# Patient Record
Sex: FEMALE | Race: WHITE | NOT HISPANIC OR LATINO | URBAN - METROPOLITAN AREA
[De-identification: names, ages, dates, MRNs, and addresses within clinical notes are randomized per-mention and may not be internally consistent; named-entity substitution may affect disease eponyms.]

---

## 2022-04-14 ENCOUNTER — MEDICAL CORRESPONDENCE (OUTPATIENT)
Dept: HEALTH INFORMATION MANAGEMENT | Facility: CLINIC | Age: 62
End: 2022-04-14
Payer: COMMERCIAL

## 2022-04-19 ENCOUNTER — TRANSCRIBE ORDERS (OUTPATIENT)
Dept: OTHER | Age: 62
End: 2022-04-19
Payer: COMMERCIAL

## 2022-04-19 DIAGNOSIS — R10.2 PELVIC PAIN: Primary | ICD-10-CM

## 2022-04-19 DIAGNOSIS — M62.89 PELVIC FLOOR TENSION: ICD-10-CM

## 2022-04-21 ENCOUNTER — PRE VISIT (OUTPATIENT)
Dept: UROLOGY | Facility: CLINIC | Age: 62
End: 2022-04-21
Payer: COMMERCIAL

## 2022-04-21 NOTE — TELEPHONE ENCOUNTER
Action 04/21/22 MMT   Action Taken  CSS received incoming referral and records from [facility] to Dr. Newberry for pelvic pain. Documents sent to scanning. Patient is not yet scheduled.

## 2022-04-27 ENCOUNTER — PRE VISIT (OUTPATIENT)
Dept: UROLOGY | Facility: CLINIC | Age: 62
End: 2022-04-27
Payer: COMMERCIAL

## 2022-04-27 NOTE — TELEPHONE ENCOUNTER
Reason for Visit: Consult    Diagnosis: pelvic pain, Possible interstitial cystitis    Orders/Procedures/Records: in system    Contact Patient: n/a    Rooming Requirements: David Francis  04/27/22  3:49 PM

## 2022-04-27 NOTE — TELEPHONE ENCOUNTER
Action 2022 JTV 2:31pm   Action Taken [facility] sent a request to [facility] for images.      Action 2022 JTV 12:09pm   Action Taken [facility] received and resolved images from [facility].          MEDICAL RECORDS REQUEST   [facility]  Urology Clinic  [address] [place] [id number]  PHONE: [phone]  Fax: [phone]        FUTURE VISIT INFORMATION                                                   Susana ATKINSON Akin, : 1960 scheduled for future visit at [facility]    APPOINTMENT INFORMATION:    Date: 2022    Provider:  Marco Antonio Newberry MD    Reason for Visit/Diagnosis: pelvic pain, Possible interstitial cystitis    REFERRAL INFORMATION:    Referring provider:  Kwesi Madrid MD    Referring providers clinic:  [facility]    Clinic contact number:  Ph. [phone], Fax [phone]    RECORDS REQUESTED FOR VISIT                                                     NOTES  STATUS/DETAILS   OFFICE NOTE from referring provider  yes, 2022, 2022, 2022 -- Dr. Kwesi Madrid @ [facility]   OFFICE NOTE from other specialist  yes, 2022 -- Mai Reid APRN, CNP @ [facility]    2022, 03/15/2022 -- Essence Oneal PT @ [facility]    02/15/2022, 12/15/2021 -- Lonnie Gutierrez MD @ [facility]     more in EPIC   DISCHARGE REPORT from the ER  yes, 2022, 2021 -- [facility] EMERGENCY DEPARTMENT   OPERATIVE REPORT  yes, 2022 -- Kwesi Madrid MD  2022 -- Lonnie Gutierrez MD   MEDICATION LIST  yes   LABS     URINALYSIS (UA)  yes, 2022, 2022   IMAGING (IMAGES & REPORT)  yes, 2022 -- IR PELVIC  2022, 2022 -- IR Vascular Embolization  2022, 2022, 2021 -- US Guide Vascular Access  2022, 2021 -- IR RENAL  2021 -- US PELVIS  2021, 2021 -- CT ABD/P     PRE-VISIT  CHECKLIST      Record collection complete YES   Appointment appropriately scheduled           (right time/right provider) Yes   Joint diagnostic appointment coordinated correctly          (ensure right order & amount of time) Yes   MyChart activation If no, please explain pending   Questionnaire complete If no, please explain pending

## 2022-05-04 ENCOUNTER — VIRTUAL VISIT (OUTPATIENT)
Dept: UROLOGY | Facility: CLINIC | Age: 62
End: 2022-05-04
Attending: INTERNAL MEDICINE
Payer: COMMERCIAL

## 2022-05-04 DIAGNOSIS — R39.89 BLADDER PAIN: ICD-10-CM

## 2022-05-04 DIAGNOSIS — R35.0 FREQUENCY OF URINATION: ICD-10-CM

## 2022-05-04 DIAGNOSIS — N95.2 ATROPHIC VAGINITIS: ICD-10-CM

## 2022-05-04 DIAGNOSIS — K59.00 CONSTIPATION, UNSPECIFIED CONSTIPATION TYPE: ICD-10-CM

## 2022-05-04 DIAGNOSIS — R39.15 URINARY URGENCY: Primary | ICD-10-CM

## 2022-05-04 PROCEDURE — 99205 OFFICE O/P NEW HI 60 MIN: CPT | Mod: 95 | Performed by: UROLOGY

## 2022-05-04 NOTE — PROGRESS NOTES
Patient Excluded from Care Coordination? Yes     The patient will be excluded from Care Coordination for the following reason: Patient no longer sees PCP , seeing Dr Rayray Nix  and declined. Susana is a 61 year old who is being evaluated via a billable video visit.      How would you like to obtain your AVS? MyChart  If the video visit is dropped, the invitation should be resent by: Send to e-mail at: [email or web address]  Will anyone else be joining your video visit? No      Video Start Time: 8:32 AM  Video-Visit Details    Type of service:  Video Visit    Video End Time:9:04 AM    Originating Location (pt. Location): Home    Distant Location (provider location):  [facility] UROLOGY CLINIC [place]     Platform used for Video Visit: [employer]

## 2022-05-04 NOTE — PROGRESS NOTES
HPI:  Susana Gerardo is a 61 year old female being seen for pelvic pain.  This started in April of 2021.  Her pain is above the pubic pain and off to the sides.  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 This helped a little with some pain higher up but it did not help with her lower abdominal pain.  She tried Amitriptyline but it made her feel very down.  She has also had some stabbing pain that sent her to the ER at night so she thought it helped a little but she could not tolerate.  She has also been to pelvic floor PT which helped with some constipation which started around the same time but her pain continues.  She was seen by gastroenterology.  She has a solitary left kidney and is s/p right nephrectomy for kidney cancer.    Reviewed previous notes from Dr. Madrid on 4/14/22 and Dr. Aaron from Urology on 2/15/22.    Menopause was when she was 45, no HRT    She is also experiencing urgency and frequency.    Exam:  There were no vitals taken for this visit.  GENERAL: Healthy, alert and no distress  EYES: Eyes grossly normal to inspection.  No discharge or erythema, or obvious scleral/conjunctival abnormalities.  RESP: No audible wheeze, cough, or visible cyanosis.  No visible retractions or increased work of breathing.    SKIN: Visible skin clear. No significant rash, abnormal pigmentation or lesions.  NEURO: Cranial nerves grossly intact.  Mentation and speech appropriate for age.  PSYCH: Mentation appears normal, affect normal/bright, judgement and insight intact, normal speech and appearance well-groomed.    Review of Imaging:  The following imaging exams were independently viewed by me and discussed with patient:  CT abdomen/pelvic 12/4/21  IMPRESSION:   1. Stable reversal of flow in the gonadal veins, left greater than right. This can be associated with pelvic congestion syndrome or may be incidental.   2. No acute findings in the abdomen or pelvis.     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 No venous reflux into the legs. Technically successful transcatheter embolization of the bilateral internal iliac veins to stasis using multiple fibered and non-fibered detachable micro coils via right transfemoral approach. PT tolerated very well.       Review of Labs:  The following labs were reviewed by me and discussed with the patient:  3/14/22  UA showed +1 bacteria  CMP normal with cr of 0.87      Assessment & Plan   60 y/o female with urinary pain and pressure in the suprapubic area.  She is also experiencing urinary urgency and frequency as well as constipation and atrophic vaginitis.  We discussed management of her symptoms however given the duration will proceed with a little f/u w/u.  -DNA sequencing of urine  -New Rx for estrogen cream  -f/u in 1 months for cystoscopy  -if symptoms continue after appropriate treatment then consider medications such as Uribel and/or anticholinergic for symptomatic relief.    Marco Antonio Newberry MD  [facility] UROLOGY CLINIC [place]      ==========================      Additional Coding Information:  Time spent:  60 minutes spent on the date of the encounter doing chart review, history and exam, documentation and further activities per the note

## 2022-05-04 NOTE — LETTER
5/4/2022       RE: Susana Gerardo  [address] [facility] [address]     Dear Colleague,    Thank you for referring your patient, Susana Gerardo, to the [facility] UROLOGY CLINIC [place] [facility]. Please see a copy of my visit note below.    Susana is a 61 year old who is being evaluated via a billable video visit.      How would you like to obtain your AVS? MyChart  If the video visit is dropped, the invitation should be resent by: Send to e-mail at: [email or web address]  Will anyone else be joining your video visit? No      Video Start Time: 8:32 AM  Video-Visit Details    Type of service:  Video Visit    Video End Time:9:04 AM    Originating Location (pt. Location): Home    Distant Location (provider location):  [facility]OLOGY [facility]IC [place]     Platform used for Video Visit: [employer]      HPI:  Susana Gerardo is a 61 year old female being seen for pelvic pain.  This started in April of 2021.  Her pain is above the pubic pain and off to the sides.  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 This helped a little with some pain higher up but it did not help with her lower abdominal pain.  She tried Amitriptyline but it made her feel very down.  She has also had some stabbing pain that sent her to the ER at night so she thought it helped a little but she could not tolerate.  She has also been to pelvic floor PT which helped with some constipation which started around the same time but her pain continues.  She was seen by gastroenterology.  She has a solitary left kidney and is s/p right nephrectomy for kidney cancer.    Reviewed previous notes from Dr. Madrid on 4/14/22 and Dr. Aaron from Urology on 2/15/22.    Menopause was when she was 45, no HRT    She is also experiencing urgency and frequency.    Exam:  There were no vitals taken for this visit.  GENERAL: Healthy, alert and no distress  EYES: Eyes grossly normal to  inspection.  No discharge or erythema, or obvious scleral/conjunctival abnormalities.  RESP: No audible wheeze, cough, or visible cyanosis.  No visible retractions or increased work of breathing.    SKIN: Visible skin clear. No significant rash, abnormal pigmentation or lesions.  NEURO: Cranial nerves grossly intact.  Mentation and speech appropriate for age.  PSYCH: Mentation appears normal, affect normal/bright, judgement and insight intact, normal speech and appearance well-groomed.    Review of Imaging:  The following imaging exams were independently viewed by me and discussed with patient:  CT abdomen/pelvic 12/4/21  IMPRESSION:   1. Stable reversal of flow in the gonadal veins, left greater than right. This can be associated with pelvic congestion syndrome or may be incidental.   2. No acute findings in the abdomen or pelvis.     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 No venous reflux into the legs. Technically successful transcatheter embolization of the bilateral internal iliac veins to stasis using multiple fibered and non-fibered detachable micro coils via right transfemoral approach. PT tolerated very well.       Review of Labs:  The following labs were reviewed by me and discussed with the patient:  3/14/22  UA showed +1 bacteria  CMP normal with cr of 0.87      Assessment & Plan   62 y/o female with urinary pain and pressure in the suprapubic area.  She is also experiencing urinary urgency and frequency as well as constipation and atrophic vaginitis.  We discussed management of her symptoms however given the duration will proceed with a little f/u w/u.  -DNA sequencing of urine  -New Rx for estrogen cream  -f/u in 1 months for cystoscopy  -if symptoms continue after appropriate treatment then consider medications such as Uribel and/or anticholinergic for symptomatic  relief.    Marco Antonio Newberry MD  [facility] UROLOGY [facility] [place]      ==========================      Additional Coding Information:  Time spent:  60 minutes spent on the date of the encounter doing chart review, history and exam, documentation and further activities per the note

## 2022-05-04 NOTE — PATIENT INSTRUCTIONS
-Please schedule a nurse visit for DNA sequencing of urine  -New Rx for estrogen cream  -f/u in 1 months for cystoscopy (6/8/22 @ 8:45)  -if symptoms continue after appropriate treatment then consider medications such as Uribel and/or anticholinergic for symptomatic relief.

## 2022-05-13 ENCOUNTER — TELEPHONE (OUTPATIENT)
Dept: UROLOGY | Facility: CLINIC | Age: 62
End: 2022-05-13
Payer: COMMERCIAL

## 2022-05-13 NOTE — TELEPHONE ENCOUNTER
Call patient to schedule follow up in the Urology Clinic per checkout visit on 5/4/22 with Dr Brian.  Per provider schedule Cysto and set appointment for 6/15 at 8:45 AM. Left message with appointment date and time.

## 2022-05-16 ENCOUNTER — TELEPHONE (OUTPATIENT)
Dept: UROLOGY | Facility: CLINIC | Age: 62
End: 2022-05-16
Payer: COMMERCIAL

## 2022-05-16 NOTE — TELEPHONE ENCOUNTER
[facility] Call Center    Phone Message    May a detailed message be left on voicemail: yes     Reason for Call: Other: Pt called and has some questions regarding the Cysto - she may want to push it back to when she comes back from her vacation but would like clarification first, Please call pt to further discuss, Thanks!     Action Taken: Message routed to:  [facility] Uro    Travel Screening: Not Applicable

## 2022-05-16 NOTE — TELEPHONE ENCOUNTER
Spoke to pt. Pt reports that she will not be able to get to [place] on 6/15/22. She would like Dr. Newberry to make any recommendations of a urologist to see that is more local or if it is necessary to have cystoscopy completed.     Sissy Gordon, RN MSN

## 2022-05-16 NOTE — TELEPHONE ENCOUNTER
Marco Antonio Newberry MD Bratsch, Sissy SALEEM RN  Cc: Nichole Coello RN  Caller: Unspecified (Today,  9:39 AM)  Yes, that is our recommendation.  I don't know who would be closer to her, but she could certainly ask her primary to refer her to someone closer.  They would know better.     Spoke to pt. Assisted pt to reschedule for July.     Sissy Gordon, ISHAN MSN

## 2022-05-29 ENCOUNTER — HEALTH MAINTENANCE LETTER (OUTPATIENT)
Age: 62
End: 2022-05-29

## 2022-07-06 ENCOUNTER — PRE VISIT (OUTPATIENT)
Dept: UROLOGY | Facility: CLINIC | Age: 62
End: 2022-07-06

## 2022-07-06 ENCOUNTER — TELEPHONE (OUTPATIENT)
Dept: UROLOGY | Facility: CLINIC | Age: 62
End: 2022-07-06

## 2022-07-06 NOTE — TELEPHONE ENCOUNTER
Reason for Visit: Cystoscopy    Diagnosis: Urinary urgency, Frequency of urination, Bladder pain, Constipation, Atrophic vaginitis    Orders/Procedures/Records: in system    Contact Patient: n/a    Rooming Requirements: UA dip prior to getting ready for cystoscopy. If positive for Leuks and/or Nitrites, will not do cystoscopy. If positive, send urine for official UA / UC.      Yin Francis  07/06/22  5:49 PM

## 2022-07-08 ENCOUNTER — TELEPHONE (OUTPATIENT)
Dept: UROLOGY | Facility: CLINIC | Age: 62
End: 2022-07-08

## 2022-07-15 NOTE — TELEPHONE ENCOUNTER
[facility] Call Center    Phone Message    May a detailed message be left on voicemail: yes     Reason for Call: Patient has been out of country and would like to speak with Johan.  Has a few questions in regards to DNA sequencing.      Per patient, if she is not reachable, please leave her a message with a direct number to reach Johan.    Action Taken: Message routed to:  [facility] URology    Travel Screening: Not Applicable

## 2022-09-06 ENCOUNTER — PRE VISIT (OUTPATIENT)
Dept: UROLOGY | Facility: CLINIC | Age: 62
End: 2022-09-06

## 2022-09-06 NOTE — TELEPHONE ENCOUNTER
Reason for Visit: Follow-up to discuss her symptoms prior to next cysto being scheduled    Diagnosis: Urinary urgency, Frequency of urination, Bladder pain, Constipation, Atrophic vaginitis    Orders/Procedures/Records: in system    Contact Patient: n/a    Rooming Requirements: David Francis  09/06/22  3:53 PM

## 2022-09-07 ENCOUNTER — VIRTUAL VISIT (OUTPATIENT)
Dept: UROLOGY | Facility: CLINIC | Age: 62
End: 2022-09-07
Payer: COMMERCIAL

## 2022-09-07 DIAGNOSIS — N95.2 ATROPHIC VAGINITIS: Primary | ICD-10-CM

## 2022-09-07 PROCEDURE — 99214 OFFICE O/P EST MOD 30 MIN: CPT | Mod: 95 | Performed by: UROLOGY

## 2022-09-07 RX ORDER — ESTRADIOL 0.1 MG/G
2 CREAM VAGINAL
Qty: 42.5 G | Refills: 11 | Status: SHIPPED | OUTPATIENT
Start: 2022-09-08

## 2022-09-07 NOTE — PATIENT INSTRUCTIONS
-Please schedule a nurse visit for DNA sequencing of urine  -New Rx for estrogen cream  -pt. Needs a cystoscopy at some point but she has issues with transportation, so we discussed finding a local urologist because it is too difficult to manage this kind of problem remotely.  -if symptoms continue after appropriate treatment then consider medications such as Uribel and/or anticholinergic for symptomatic relief.  -f/u with me prn

## 2022-09-07 NOTE — LETTER
9/7/2022       RE: Susana Gerardo  [address] [facility] [address]     Dear Colleague,    Thank you for referring your patient, Susana Gerardo, to the [facility]OLOGY CLINIC [place] [facility]. Please see a copy of my visit note below.    .    Reason for visit:  F/u on symptoms.    Clinical data:  Ms. Gerardo is a 62 year old female with hx of pelvic pain.  This started in April of 2021.  Her pain is above the pubic pain and off to the sides.  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 This helped a little with some pain higher up but it did not help with her lower abdominal pain.  She tried Amitriptyline but it made her feel very down.  She has also had some stabbing pain that sent her to the ER at night so she thought it helped a little but she could not tolerate.  She has also been to pelvic floor PT which helped with some constipation which started around the same time but her pain continues.  She was seen by gastroenterology.  She has a solitary left kidney and is s/p right nephrectomy for kidney cancer.    Menopause was when she was 45, no HRT    She is also experiencing urgency and frequency.    We had planned on sending her urine for DNA sequencing and having her come in for a cystoscopy but she was out of the country and this did not happen.  Also she did not start her estrogen cream.    CT abdomen/pelvic 12/4/21  IMPRESSION:   1. Stable reversal of flow in the gonadal veins, left greater than right. This can be associated with pelvic congestion syndrome or may be incidental.   2. No acute findings in the abdomen or pelvis.     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 No venous reflux into the legs. Technically successful transcatheter embolization of the bilateral internal iliac veins to stasis using  multiple fibered and non-fibered detachable micro coils via right transfemoral approach. PT tolerated very well.       3/14/22  UA showed +1 bacteria  CMP normal with cr of 0.87      Assessment & Plan   61 y/o female with urinary pain and pressure in the suprapubic area.  She is also experiencing urinary urgency and frequency as well as constipation and atrophic vaginitis.  We discussed management of her symptoms however given the duration will proceed with a little f/u w/u.  -DNA sequencing of urine  -New Rx for estrogen cream  -pt. Needs a cystoscopy at some point but she has issues with transportation, so we discussed finding a local urologist because it is too difficult to manage this kind of problem remotely.  Furthermore patient has some memory issues due to a previous concussion which makes it even more difficult.  I recommended some urologist close by.  -if symptoms continue after appropriate treatment then consider medications such as Uribel and/or anticholinergic for symptomatic relief.  -f/u with me rowena Newberry MD  [facility] UROLOGY [facility]      ==========================      Additional Coding Information:  Time spent:  35 minutes spent on the date of the encounter doing chart review, history and exam, documentation and further activities per the note    Susana is a 62 year old who is being evaluated via a billable video visit.      How would you like to obtain your AVS? MyChart  If the video visit is dropped, the invitation should be resent by: Text to cell phone: [phone]  Will anyone else be joining your video visit? No        Video-Visit Details    Video Start Time: 8:23 AM    Type of service:  Video Visit    Video End Time:8:41 AM    Originating Location (pt. Location): Home    Distant Location (provider location):  [facility] UROLOGY [facility]     Platform used for Video Visit: [employer]          Again, thank you for allowing me to participate in the  care of your patient.      Sincerely,    Marco Antonio Newberry MD

## 2022-09-07 NOTE — LETTER
Date:September 7, 2022      Patient was self referred, no letter generated. Do not send.        [facility] Health Information

## 2022-09-07 NOTE — PROGRESS NOTES
Reason for visit:  F/u on symptoms.    Clinical data:  Ms. Gerardo is a 62 year old female with hx of pelvic pain.  This started in April of 2021.  Her pain is above the pubic pain and off to the sides.  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 This helped a little with some pain higher up but it did not help with her lower abdominal pain.  She tried Amitriptyline but it made her feel very down.  She has also had some stabbing pain that sent her to the ER at night so she thought it helped a little but she could not tolerate.  She has also been to pelvic floor PT which helped with some constipation which started around the same time but her pain continues.  She was seen by gastroenterology.  She has a solitary left kidney and is s/p right nephrectomy for kidney cancer.    Menopause was when she was 45, no HRT    She is also experiencing urgency and frequency.    We had planned on sending her urine for DNA sequencing and having her come in for a cystoscopy but she was out of the country and this did not happen.  Also she did not start her estrogen cream.    CT abdomen/pelvic 12/4/21  IMPRESSION:   1. Stable reversal of flow in the gonadal veins, left greater than right. This can be associated with pelvic congestion syndrome or may be incidental.   2. No acute findings in the abdomen or pelvis.     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 No venous reflux into the legs. Technically successful transcatheter embolization of the bilateral internal iliac veins to stasis using multiple fibered and non-fibered detachable micro coils via right transfemoral approach. PT tolerated very well.       3/14/22  UA showed +1 bacteria  CMP normal with cr of 0.87      Assessment & Plan   63 y/o female with urinary pain and pressure in the suprapubic area.  She is also experiencing urinary  urgency and frequency as well as constipation and atrophic vaginitis.  We discussed management of her symptoms however given the duration will proceed with a little f/u w/u.  -DNA sequencing of urine  -New Rx for estrogen cream  -pt. Needs a cystoscopy at some point but she has issues with transportation, so we discussed finding a local urologist because it is too difficult to manage this kind of problem remotely.  Furthermore patient has some memory issues due to a previous concussion which makes it even more difficult.  I recommended some urologist close by.  -if symptoms continue after appropriate treatment then consider medications such as Uribel and/or anticholinergic for symptomatic relief.  -f/u with me rowena Newberry MD  [facility] UROLOGY [facility]      ==========================      Additional Coding Information:  Time spent:  35 minutes spent on the date of the encounter doing chart review, history and exam, documentation and further activities per the note    Susana is a 62 year old who is being evaluated via a billable video visit.      How would you like to obtain your AVS? MyChart  If the video visit is dropped, the invitation should be resent by: Text to cell phone: [phone]  Will anyone else be joining your video visit? No        Video-Visit Details    Video Start Time: 8:23 AM    Type of service:  Video Visit    Video End Time:8:41 AM    Originating Location (pt. Location): Home    Distant Location (provider location):  [facility] UROLOGY [facility]     Platform used for Video Visit: [employer]

## 2022-10-03 ENCOUNTER — HEALTH MAINTENANCE LETTER (OUTPATIENT)
Age: 62
End: 2022-10-03

## 2022-12-22 ENCOUNTER — TELEPHONE (OUTPATIENT)
Dept: UROLOGY | Facility: CLINIC | Age: 62
End: 2022-12-22

## 2022-12-22 DIAGNOSIS — R39.89 BLADDER PAIN: ICD-10-CM

## 2022-12-22 DIAGNOSIS — R39.15 URGENCY OF URINATION: Primary | ICD-10-CM

## 2022-12-22 DIAGNOSIS — R35.0 URINARY FREQUENCY: ICD-10-CM

## 2022-12-22 NOTE — TELEPHONE ENCOUNTER
Spoke to pt. Pt has been seeing urologist up in [place] with normal follow up. Then started seeing a gynocologist and no concerns were noted. So she believes that she does have interstitial cystitis.     Pain has gotten worse in the last week. Sharp pains on either side of hips and right above pubic bone. Lasts up to 3 hours. Rated 9/10. She states that it is intolerable and has to go home when it recurs while at work. Pt is able to urinate well and empty bladder ok. Increased urgency and frequency recently. No fever. No hematuria. Urine is yellow and clear. No odor noted. Pt also feels bloated. Advised to increase water intake and take tylenol, ibuprofen for relief or use a heating pad. Pt will get UA/UC done locally tomorrow. Will have orders faxed to local clinic at [facility] in [place], [place] at [phone]. Also assisted pt to schedule follow up visit with provider.     Sissy Gordon MSN RN    Orders Placed This Encounter   Procedures     Routine UA with microscopic - No culture     Standing Status:   Future     Standing Expiration Date:   12/22/2023     Urine Culture Aerobic Bacterial     Standing Status:   Future     Standing Expiration Date:   12/22/2023

## 2022-12-22 NOTE — TELEPHONE ENCOUNTER
[facility] Call Center    Phone Message    May a detailed message be left on voicemail: yes     Reason for Call: Patient called stating that she needs to speak to Dr. Newberry regarding her continuous symptoms. States they are starting to get really painful and the pain level is really high. Stated that she went to the doctor's office yesterday and she needs to talk about what they were talking about. Patient didn't want to go into detail. Writer sending TE but not as symptoms message since patient has been being seen for these already just currently having pain. Will [place] high priority. Please contact this patient in regards to this message. Thank you     Action Taken: Message routed to:  [facility] ([facility] Urology    Travel Screening: Not Applicable

## 2022-12-22 NOTE — TELEPHONE ENCOUNTER
Requested orders faxed to [facility] in [place], [place] at [phone].    Cherrie Leahy CMA  12/22/22  2:49 PM

## 2022-12-28 ENCOUNTER — PRE VISIT (OUTPATIENT)
Dept: UROLOGY | Facility: CLINIC | Age: 62
End: 2022-12-28

## 2022-12-28 NOTE — TELEPHONE ENCOUNTER
Reason for Visit: Follow-up to discuss Bladder pain    Diagnosis: urinary pain, pressure in the suprapubic area, urinary urgency/frequency, atrophic vaginitis    Rooming Requirements: David Francis  12/28/22  3:25 PM

## 2023-01-04 ENCOUNTER — VIRTUAL VISIT (OUTPATIENT)
Dept: UROLOGY | Facility: CLINIC | Age: 63
End: 2023-01-04
Payer: COMMERCIAL

## 2023-01-04 DIAGNOSIS — N95.2 ATROPHIC VAGINITIS: ICD-10-CM

## 2023-01-04 DIAGNOSIS — K59.04 CHRONIC IDIOPATHIC CONSTIPATION: Primary | ICD-10-CM

## 2023-01-04 PROCEDURE — 99215 OFFICE O/P EST HI 40 MIN: CPT | Mod: 95 | Performed by: UROLOGY

## 2023-01-04 NOTE — PROGRESS NOTES
Video-Visit Details    Type of service:  Video Visit    Video Start Time (time video started): 8:42 AM      Video End Time (time video stopped): 9:00 AM      Originating Location (pt. Location): Home        Distant Location (provider location):  Off-site    Mode of Communication:  Video Conference via Kim Gallardo

## 2023-01-04 NOTE — PROGRESS NOTES
Reason for visit:  F/u on symptoms.    Clinical data:  Ms. Gerardo is a 62 year old female with hx of pelvic pain.  This started in April of 2021.  Her pain is above the pubic pain and off to the sides.  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 This helped a little with some pain higher up but it did not help with her lower abdominal pain.  She tried Amitriptyline but it made her feel very down.  She has also had some stabbing pain that sent her to the ER at night so she thought it helped a little but she could not tolerate.  She has also been to pelvic floor PT which helped with some constipation which started around the same time but her pain continues.  She was seen by gastroenterology.  She has a solitary left kidney and is s/p right nephrectomy for kidney cancer.    Menopause was when she was 45, no HRT    She is also experiencing urgency and frequency.    We had planned on sending her urine for DNA sequencing and having her come in for a cystoscopy [place] and [place] happen.  She did start her estrogen cream and felt that it helps a little.   She did get a cystoscopy eventually by Dr. Johnson at the end of 2022.  Per her report it was normal.  She also had gynecological w/u and it was negative.    CT abdomen/pelvic 12/4/21  IMPRESSION:   1. Stable reversal of flow in the gonadal veins, left greater than right. This can be associated with pelvic congestion syndrome or may be incidental.   2. No acute findings in the abdomen or pelvis.     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 No venous reflux into the legs. Technically successful transcatheter embolization of the bilateral internal iliac veins to stasis using multiple fibered and non-fibered detachable micro coils via right transfemoral approach. PT tolerated very well.       3/14/22  UA  showed +1 bacteria  CMP normal with cr of 0.87      Assessment & Plan   61 y/o female with  pressure in the suprapubic area and hip area.  She is also experiencing abdominal bloating and cramping.  This does not seem to be related to her urinary tract.  Her cystoscopy by Dr. Johnson was normal.  This sounds like a GI problem and I recommended she revisit with her GI physician.  I am happy to discuss this further with her physician.  She does not have any urgency or frequency.  PFPT for her bowels helps a little but unless she follows a strict diet her symptoms of constipation return  -continue estrogen cream for atrophic vaginitis  -f/u with with her GI doctor  -f/u with me rowena Newberry MD  [facility] [place]      ==========================      Additional Coding Information:  Time spent:  42 minutes spent on the date of the encounter doing chart review, history and exam, documentation and further activities per the note

## 2023-01-04 NOTE — LETTER
1/4/2023       RE: Susana Gerardo  [address] [facility] [address]     Dear Colleague,    Thank you for referring your patient, Susana Gerardo, to the [facility] [place] at [facility]. Please see a copy of my visit note below.    Video-Visit Details    Type of service:  Video Visit    Video Start Time (time video started): 8:42 AM      Video End Time (time video stopped): 9:00 AM      Originating Location (pt. Location): Home        Distant Location (provider location):  Off-site    Mode of Communication:  Video Conference via [employer]    Fadumo Gallardo      Reason for visit:  F/u on symptoms.    Clinical data:  Ms. Gerardo is a 62 year old female with hx of pelvic pain.  This started in April of 2021.  Her pain is above the pubic pain and off to the sides.  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 This helped a little with some pain higher up but it did not help with her lower abdominal pain.  She tried Amitriptyline but it made her feel very down.  She has also had some stabbing pain that sent her to the ER at night so she thought it helped a little but she could not tolerate.  She has also been to pelvic floor PT which helped with some constipation which started around the same time but her pain continues.  She was seen by gastroenterology.  She has a solitary left kidney and is s/p right nephrectomy for kidney cancer.    Menopause was when she was 45, no HRT    She is also experiencing urgency and frequency.    We had planned on sending her urine for DNA sequencing and having her come in for a cystoscopy but [place] was [place] and this did not happen.  She did start her estrogen cream and felt that it helps a little.   She did get a cystoscopy eventually by Dr. Johnson at the end of 2022.  Per her report it was normal.  She also had gynecological w/u and it was negative.    CT abdomen/pelvic  12/4/21  IMPRESSION:   1. Stable reversal of flow in the gonadal veins, left greater than right. This can be associated with pelvic congestion syndrome or may be incidental.   2. No acute findings in the abdomen or pelvis.     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 No venous reflux into the legs. Technically successful transcatheter embolization of the bilateral internal iliac veins to stasis using multiple fibered and non-fibered detachable micro coils via right transfemoral approach. PT tolerated very well.       3/14/22  UA showed +1 bacteria  CMP normal with cr of 0.87      Assessment & Plan   61 y/o female with  pressure in the suprapubic area and hip area.  She is also experiencing abdominal bloating and cramping.  This does not seem to be related to her urinary tract.  Her cystoscopy by Dr. Johnson was normal.  This sounds like a GI problem and I recommended she revisit with her GI physician.  I am happy to discuss this further with her physician.  She does not have any urgency or frequency.  PFPT for her bowels helps a little but unless she follows a strict diet her symptoms of constipation return  -continue estrogen cream for atrophic vaginitis  -f/u with with her GI doctor  -f/u with me prn    Marco Antonio Newberry MD  [facility]OLOG[facility] [place]      ==========================      Additional Coding Information:  Time spent:  42 minutes spent on the date of the encounter doing chart review, history and exam, documentation and further activities per the note        Again, thank you for allowing me to participate in the care of your patient.      Sincerely,    Marco Antonio Newberry MD

## 2023-01-04 NOTE — LETTER
Date:January 4, 2023      Provider requested that no letter be sent. Do not send.       [facility]

## 2023-01-04 NOTE — NURSING NOTE
Pt states they are currently taking acid reflux medication and would like to discuss options with you at today's visit.     Pt did not want to continue on to the PHQ9. Pt states they currently have a mental health plan.

## 2023-06-04 ENCOUNTER — HEALTH MAINTENANCE LETTER (OUTPATIENT)
Age: 63
End: 2023-06-04

## 2024-07-28 ENCOUNTER — HEALTH MAINTENANCE LETTER (OUTPATIENT)
Age: 64
End: 2024-07-28

## 2025-08-10 ENCOUNTER — HEALTH MAINTENANCE LETTER (OUTPATIENT)
Age: 65
End: 2025-08-10